# Patient Record
Sex: FEMALE | Race: WHITE | NOT HISPANIC OR LATINO
[De-identification: names, ages, dates, MRNs, and addresses within clinical notes are randomized per-mention and may not be internally consistent; named-entity substitution may affect disease eponyms.]

---
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
## 2025-02-26 NOTE — ED PEDIATRIC NURSE NOTE - DISCHARGE DATE/TIME
Brief Cross Cover Note    Anxious overnight. Frequently requested to sit up in bed and have her feet over the side of the bed, despite multiple conversations with nursing staff that this was unsafe. Component of pain, so ordered 1x dilaudid dose to help with discomfort. Did take melatonin, but was unhelpful in helping patient sleep. Discussed the risks and benefits of medication options with daughter on the phone, however, she did not want Aranza to take any medications other than additional medication for her pain. 1:1 sitter order placed.    Humberto Sarmiento MD  Internal Medicine and Pediatrics  [facility]  Available via [employer]   07-Oct-2017 23:24